# Patient Record
Sex: FEMALE | Race: BLACK OR AFRICAN AMERICAN | NOT HISPANIC OR LATINO | Employment: FULL TIME | URBAN - METROPOLITAN AREA
[De-identification: names, ages, dates, MRNs, and addresses within clinical notes are randomized per-mention and may not be internally consistent; named-entity substitution may affect disease eponyms.]

---

## 2024-09-05 ENCOUNTER — TELEPHONE (OUTPATIENT)
Dept: INTERNAL MEDICINE | Facility: CLINIC | Age: 59
End: 2024-09-05
Payer: COMMERCIAL

## 2024-09-05 NOTE — TELEPHONE ENCOUNTER
Contacted the patient regarding an appointment. Enedina requested to switch appointments with her daughter, Lisa. They're establishing care with Lisa Abel on 9/13 & Enedina on 11/4. I sent her request to my supervisor, Laury, for the appointment adjustment & she was able to accommodate her. I called Enedina to let her know that her appointment is now on 9/13 & her daughter, Lisa's is on 11/4.

## 2024-09-05 NOTE — TELEPHONE ENCOUNTER
----- Message from Margret Conrad sent at 2024 12:46 PM CDT -----  Contact: pt  Enedina is calling to switch appts with her daughter:    Enedina is live for  @ 9:40am    Daughter Lisa Goodman (MRN: [id number]  : 2001)  Is live for  @ 9:40am    (I was not able to do this in Book it)    Please see if you can switch the appts and call to advise at [phone]

## 2024-09-13 ENCOUNTER — LAB VISIT (OUTPATIENT)
Dept: LAB | Facility: HOSPITAL | Age: 59
End: 2024-09-13
Attending: FAMILY MEDICINE
Payer: COMMERCIAL

## 2024-09-13 ENCOUNTER — OFFICE VISIT (OUTPATIENT)
Dept: INTERNAL MEDICINE | Facility: CLINIC | Age: 59
End: 2024-09-13
Payer: COMMERCIAL

## 2024-09-13 VITALS
BODY MASS INDEX: 36.49 KG/M2 | SYSTOLIC BLOOD PRESSURE: 126 MMHG | OXYGEN SATURATION: 98 % | HEIGHT: 63 IN | HEART RATE: 69 BPM | TEMPERATURE: 97 F | DIASTOLIC BLOOD PRESSURE: 76 MMHG | WEIGHT: 205.94 LBS

## 2024-09-13 DIAGNOSIS — Z00.00 ROUTINE GENERAL MEDICAL EXAMINATION AT A HEALTH CARE FACILITY: ICD-10-CM

## 2024-09-13 DIAGNOSIS — I10 PRIMARY HYPERTENSION: Chronic | ICD-10-CM

## 2024-09-13 DIAGNOSIS — Z11.3 SCREEN FOR STD (SEXUALLY TRANSMITTED DISEASE): ICD-10-CM

## 2024-09-13 DIAGNOSIS — E55.9 VITAMIN D DEFICIENCY: ICD-10-CM

## 2024-09-13 DIAGNOSIS — H61.23 BILATERAL IMPACTED CERUMEN: ICD-10-CM

## 2024-09-13 DIAGNOSIS — Z00.00 ROUTINE GENERAL MEDICAL EXAMINATION AT A HEALTH CARE FACILITY: Primary | ICD-10-CM

## 2024-09-13 LAB
ALBUMIN SERPL BCP-MCNC: 3.8 G/DL (ref 3.5–5.2)
ALP SERPL-CCNC: 96 U/L (ref 55–135)
ALT SERPL W/O P-5'-P-CCNC: 13 U/L (ref 10–44)
ANION GAP SERPL CALC-SCNC: 7 MMOL/L (ref 8–16)
AST SERPL-CCNC: 14 U/L (ref 10–40)
BASOPHILS # BLD AUTO: 0.05 K/UL (ref 0–0.2)
BASOPHILS NFR BLD: 0.7 % (ref 0–1.9)
BILIRUB SERPL-MCNC: 0.3 MG/DL (ref 0.1–1)
BUN SERPL-MCNC: 14 MG/DL (ref 6–20)
CALCIUM SERPL-MCNC: 9.8 MG/DL (ref 8.7–10.5)
CHLORIDE SERPL-SCNC: 106 MMOL/L (ref 95–110)
CHOLEST SERPL-MCNC: 203 MG/DL (ref 120–199)
CHOLEST/HDLC SERPL: 2.4 {RATIO} (ref 2–5)
CO2 SERPL-SCNC: 26 MMOL/L (ref 23–29)
CREAT SERPL-MCNC: 0.9 MG/DL (ref 0.5–1.4)
DIFFERENTIAL METHOD BLD: ABNORMAL
EOSINOPHIL # BLD AUTO: 0 K/UL (ref 0–0.5)
EOSINOPHIL NFR BLD: 0.4 % (ref 0–8)
ERYTHROCYTE [DISTWIDTH] IN BLOOD BY AUTOMATED COUNT: 14.3 % (ref 11.5–14.5)
EST. GFR  (NO RACE VARIABLE): >60 ML/MIN/1.73 M^2
ESTIMATED AVG GLUCOSE: 108 MG/DL (ref 68–131)
GLUCOSE SERPL-MCNC: 88 MG/DL (ref 70–110)
HBA1C MFR BLD: 5.4 % (ref 4–5.6)
HCT VFR BLD AUTO: 45.8 % (ref 37–48.5)
HDLC SERPL-MCNC: 84 MG/DL (ref 40–75)
HDLC SERPL: 41.4 % (ref 20–50)
HGB BLD-MCNC: 13.8 G/DL (ref 12–16)
IMM GRANULOCYTES # BLD AUTO: 0.04 K/UL (ref 0–0.04)
IMM GRANULOCYTES NFR BLD AUTO: 0.6 % (ref 0–0.5)
LDLC SERPL CALC-MCNC: 103.2 MG/DL (ref 63–159)
LYMPHOCYTES # BLD AUTO: 1.7 K/UL (ref 1–4.8)
LYMPHOCYTES NFR BLD: 24.3 % (ref 18–48)
MCH RBC QN AUTO: 29.6 PG (ref 27–31)
MCHC RBC AUTO-ENTMCNC: 30.1 G/DL (ref 32–36)
MCV RBC AUTO: 98 FL (ref 82–98)
MONOCYTES # BLD AUTO: 0.4 K/UL (ref 0.3–1)
MONOCYTES NFR BLD: 5.6 % (ref 4–15)
NEUTROPHILS # BLD AUTO: 4.7 K/UL (ref 1.8–7.7)
NEUTROPHILS NFR BLD: 68.4 % (ref 38–73)
NONHDLC SERPL-MCNC: 119 MG/DL
NRBC BLD-RTO: 0 /100 WBC
PLATELET # BLD AUTO: 341 K/UL (ref 150–450)
PMV BLD AUTO: 10.3 FL (ref 9.2–12.9)
POTASSIUM SERPL-SCNC: 4.2 MMOL/L (ref 3.5–5.1)
PROT SERPL-MCNC: 7.4 G/DL (ref 6–8.4)
RBC # BLD AUTO: 4.67 M/UL (ref 4–5.4)
SODIUM SERPL-SCNC: 139 MMOL/L (ref 136–145)
T4 FREE SERPL-MCNC: 0.96 NG/DL (ref 0.71–1.51)
TREPONEMA PALLIDUM IGG+IGM AB [PRESENCE] IN SERUM OR PLASMA BY IMMUNOASSAY: NONREACTIVE
TRIGL SERPL-MCNC: 79 MG/DL (ref 30–150)
TSH SERPL DL<=0.005 MIU/L-ACNC: 1.14 UIU/ML (ref 0.4–4)
WBC # BLD AUTO: 6.92 K/UL (ref 3.9–12.7)

## 2024-09-13 PROCEDURE — 80061 LIPID PANEL: CPT | Performed by: FAMILY MEDICINE

## 2024-09-13 PROCEDURE — 87529 HSV DNA AMP PROBE: CPT | Performed by: FAMILY MEDICINE

## 2024-09-13 PROCEDURE — 99386 PREV VISIT NEW AGE 40-64: CPT | Mod: S$GLB,,, | Performed by: FAMILY MEDICINE

## 2024-09-13 PROCEDURE — 86803 HEPATITIS C AB TEST: CPT | Performed by: FAMILY MEDICINE

## 2024-09-13 PROCEDURE — 3078F DIAST BP <80 MM HG: CPT | Mod: CPTII,S$GLB,, | Performed by: FAMILY MEDICINE

## 2024-09-13 PROCEDURE — 87389 HIV-1 AG W/HIV-1&-2 AB AG IA: CPT | Performed by: FAMILY MEDICINE

## 2024-09-13 PROCEDURE — 86696 HERPES SIMPLEX TYPE 2 TEST: CPT | Performed by: FAMILY MEDICINE

## 2024-09-13 PROCEDURE — 86593 SYPHILIS TEST NON-TREP QUANT: CPT | Performed by: FAMILY MEDICINE

## 2024-09-13 PROCEDURE — 3074F SYST BP LT 130 MM HG: CPT | Mod: CPTII,S$GLB,, | Performed by: FAMILY MEDICINE

## 2024-09-13 PROCEDURE — 83036 HEMOGLOBIN GLYCOSYLATED A1C: CPT | Performed by: FAMILY MEDICINE

## 2024-09-13 PROCEDURE — 1159F MED LIST DOCD IN RCRD: CPT | Mod: CPTII,S$GLB,, | Performed by: FAMILY MEDICINE

## 2024-09-13 PROCEDURE — 84443 ASSAY THYROID STIM HORMONE: CPT | Performed by: FAMILY MEDICINE

## 2024-09-13 PROCEDURE — 84439 ASSAY OF FREE THYROXINE: CPT | Performed by: FAMILY MEDICINE

## 2024-09-13 PROCEDURE — 3008F BODY MASS INDEX DOCD: CPT | Mod: CPTII,S$GLB,, | Performed by: FAMILY MEDICINE

## 2024-09-13 PROCEDURE — 86695 HERPES SIMPLEX TYPE 1 TEST: CPT | Performed by: FAMILY MEDICINE

## 2024-09-13 PROCEDURE — 3044F HG A1C LEVEL LT 7.0%: CPT | Mod: CPTII,S$GLB,, | Performed by: FAMILY MEDICINE

## 2024-09-13 PROCEDURE — 85025 COMPLETE CBC W/AUTO DIFF WBC: CPT | Performed by: FAMILY MEDICINE

## 2024-09-13 PROCEDURE — 82652 VIT D 1 25-DIHYDROXY: CPT | Performed by: FAMILY MEDICINE

## 2024-09-13 PROCEDURE — 99999 PR PBB SHADOW E&M-EST. PATIENT-LVL III: CPT | Mod: PBBFAC,,, | Performed by: FAMILY MEDICINE

## 2024-09-13 PROCEDURE — 80053 COMPREHEN METABOLIC PANEL: CPT | Performed by: FAMILY MEDICINE

## 2024-09-13 PROCEDURE — 4010F ACE/ARB THERAPY RXD/TAKEN: CPT | Mod: CPTII,S$GLB,, | Performed by: FAMILY MEDICINE

## 2024-09-13 PROCEDURE — 1160F RVW MEDS BY RX/DR IN RCRD: CPT | Mod: CPTII,S$GLB,, | Performed by: FAMILY MEDICINE

## 2024-09-13 RX ORDER — LISINOPRIL 20 MG/1
20 TABLET ORAL
COMMUNITY

## 2024-09-13 RX ORDER — LIFITEGRAST 50 MG/ML
1 SOLUTION/ DROPS OPHTHALMIC 2 TIMES DAILY
COMMUNITY
Start: 2024-07-21

## 2024-09-13 NOTE — PROGRESS NOTES
ADVISED PT LOSARTAN WAS ALREADY SENT TO [facility], HE IS NOT SURE IF HE NEEDS ANYTHING ELSE REFILLED, CALL HIM BACK Subjective     Patient ID: Enedina Goodman is a 59 y.o. female.    Chief Complaint: Establish Care and Annual Exam    History of Present Illness    CHIEF COMPLAINT:  Enedina presents today for an annual exam and to discuss recent test results.    SEXUAL HEALTH:  She reports receiving a positive HSV-2 test result in July, denying any symptoms. She is currently not sexually active but anticipates being so in the future. She expresses concerns about potential transmission and seeks guidance on managing potential risks.    GRIEF AND LIFE CHANGES:  She reports the loss of her  due to COVID in 2021, experiencing ongoing grief. She has sought some grief counseling but primarily rita with grief as it arises. She notes experiencing triggers, particularly during holidays and family events. 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 She reports having a strong support system of friends and family.    SLEEP:  She reports difficulty falling asleep, often staying up late despite recognizing the need for rest. This sleep disturbance has been present since her 's passing. She denies feeling overpowered by sleepiness at bedtime. Regular exercise, specifically using her treadmill 3-4 times per week, improves her sleep quality.    MEDICAL HISTORY:  She reports a history of colon polyps, with her most recent colonoscopy last year showing no polyps. She is on a 5-year follow-up schedule for colon cancer screening. She also reports a history of chicken pox during childhood and a dry eye condition.    MEDICATIONS AND SUPPLEMENTS:  She continues to use amlodipine and lisinopril without issues. No refills are needed at this time. She takes vitamin D supplementation and uses eye drops for dry eyes.      SOCIAL  HISTORY:  She is retired from her previous role as an  director with the [employer]. She currently works from home as an  for a nonprofit organization, which she finds satisfying.    PREVENTIVE CARE:  Her colonoscopy is up to date, with the most recent one last year. Her mammogram is due in November. Her tetanus vaccination is not due until 2026. She is considering the shingles vaccine but expresses concern about potential reactions based on past experiences.    ALLERGIES:  She reports a history of reactions to vaccines, expressing hesitancy about receiving the shingles vaccine.    ENT:  She reports wax buildup in both ears but denies any problems with hearing. She has a dry eye condition and uses eye drops to manage it. An eye doctor had recommended a procedure for her dry eyes, but she has not pursued it due to the cost. Her last eye exam was approximately 8 years ago, with no reported issues at that time.      ROS:  General: -fever, -chills, -fatigue, -weight gain, -weight loss  Eyes: -vision changes, -redness, -discharge, +dry eyes  ENT: -ear pain, -nasal congestion, -sore throat, -hearing loss  Cardiovascular: -chest pain, -palpitations, -lower extremity edema  Respiratory: -cough, -shortness of breath  Gastrointestinal: -abdominal pain, -nausea, -vomiting, -diarrhea, -constipation, -blood in stool  Genitourinary: -dysuria, -hematuria, -frequency  Musculoskeletal: -joint pain, -muscle pain  Skin: -rash, -lesion  Neurological: -headache, -dizziness, -numbness, -tingling  Psychiatric: -anxiety, -depression, +sleep difficulty, +emotional lability  Allergic: +allergic reactions            Objective     Physical Exam  Vitals and nursing note reviewed.   Constitutional:       Appearance: Normal appearance. She is obese.   HENT:      Head: Normocephalic and atraumatic.      Right Ear: Tympanic membrane, ear canal and external ear normal. There is impacted cerumen.      Left Ear: Tympanic  membrane, ear canal and external ear normal. There is impacted cerumen.      Nose: Nose normal.      Mouth/Throat:      Mouth: Mucous membranes are moist.      Pharynx: Oropharynx is clear.   Eyes:      Extraocular Movements: Extraocular movements intact.      Conjunctiva/sclera: Conjunctivae normal.   Cardiovascular:      Rate and Rhythm: Normal rate and regular rhythm.      Pulses: Normal pulses.      Heart sounds: Normal heart sounds.   Pulmonary:      Effort: Pulmonary effort is normal.      Breath sounds: Normal breath sounds.   Abdominal:      General: Abdomen is flat. Bowel sounds are normal.      Palpations: Abdomen is soft.   Musculoskeletal:      Cervical back: Normal range of motion and neck supple.      Right lower leg: No edema.      Left lower leg: No edema.   Lymphadenopathy:      Cervical: No cervical adenopathy.   Skin:     General: Skin is warm and dry.   Neurological:      General: No focal deficit present.      Mental Status: She is alert and oriented to person, place, and time.   Psychiatric:         Mood and Affect: Mood normal.         Behavior: Behavior normal.                Assessment and Plan     1. Routine general medical examination at a health care facility  Comments:  reviewed age appropriate screenings and immunizations  Orders:  -     T4, Free; Future; Expected date: 09/13/2024  -     TSH; Future; Expected date: 09/13/2024  -     Hemoglobin A1C; Future; Expected date: 09/13/2024  -     Lipid Panel; Future; Expected date: 09/13/2024  -     Comprehensive Metabolic Panel; Future; Expected date: 09/13/2024  -     CBC Auto Differential; Future; Expected date: 09/13/2024    2. Screen for STD (sexually transmitted disease)  -     HIV 1/2 Ag/Ab (4th Gen); Future; Expected date: 09/13/2024  -     HEPATITIS C ANTIBODY; Future; Expected date: 09/13/2024  -     Treponema Pallidium Antibodies IgG, IgM; Future; Expected date: 09/13/2024  -     HERPES SIMPLEX 1&2 IGG; Future; Expected date:  09/13/2024  -     HERPES SIMPLEX VIRUS (HSV) TYPE 1 & 2 DNA BY PCR; Future; Expected date: 09/13/2024    3. Vitamin D deficiency  -     Calcitriol; Future; Expected date: 09/13/2024    4. Primary hypertension  Comments:  stable cont amlodipine and lisinopril    5. Bilateral impacted cerumen  -     Ear wax removal        Assessment & Plan    Assessed HSV status: no active infection or symptoms present, no immediate treatment necessary  Recommend rechecking HSV IgG and IgM levels to confirm current status and inform future management  Considered grief process following spouse's death in 2021 and its potential impact on sleep patterns  Evaluated support system and current work situation as part of overall well-being assessment  Noted patient is up-to-date on most preventive screenings, including recent colonoscopy with Dr. Harrell    PATIENT EDUCATION:   Explained HSV is common and can be contracted through various means, not just sexual contact   Clarified HSV-1 typically causes oral herpes while HSV-2 typically causes genital herpes, but cross-infection can occur   Discussed HSV can be present without symptoms and does not necessarily indicate infidelity   Informed about shingles vaccine availability, acknowledging history of vaccine reactions    MEDICATIONS:   Continued amlodipine and lisinopril at current doses    ORDERS:   Ordered comprehensive lab work: CBC, chemistry panel, cholesterol, thyroid function, A1C, vitamin D level   Added HSV IgG and IgM tests to confirm current status   Ordered HIV, hepatitis C, and syphilis screening   Performed earwax removal procedure    FOLLOW UP:   Follow up with new OB/GYN in November for mammogram scheduling   Contact the office if any questions or concerns arise before next appointment              Follow up in about 1 year (around 9/13/2025) for [facility] Dr. Harrell, Annual Exam.    This note was generated with the assistance of ambient listening technology.  Verbal consent was obtained by the patient and accompanying visitor(s) for the recording of patient appointment to facilitate this note. I attest to having reviewed and edited the generated note for accuracy, though some syntax or spelling errors may persist. Please contact the author of this note for any clarification.

## 2024-09-14 LAB
HCV AB SERPL QL IA: NORMAL
HIV 1+2 AB+HIV1 P24 AG SERPL QL IA: NORMAL
HSV1 IGG SERPL QL IA: POSITIVE
HSV2 IGG SERPL QL IA: POSITIVE

## 2024-09-16 LAB — 1,25(OH)2D3 SERPL-MCNC: 92 PG/ML (ref 20–79)

## 2024-09-17 PROBLEM — I10 PRIMARY HYPERTENSION: Chronic | Status: ACTIVE | Noted: 2024-09-17

## 2024-09-17 LAB
HSV-1 DNA BY PCR: NEGATIVE
HSV-2 DNA BY PCR: NEGATIVE

## 2024-09-25 ENCOUNTER — PATIENT OUTREACH (OUTPATIENT)
Dept: ADMINISTRATIVE | Facility: HOSPITAL | Age: 59
End: 2024-09-25
Payer: COMMERCIAL

## 2024-12-19 ENCOUNTER — PATIENT MESSAGE (OUTPATIENT)
Dept: ADMINISTRATIVE | Facility: HOSPITAL | Age: 59
End: 2024-12-19
Payer: COMMERCIAL

## 2024-12-27 ENCOUNTER — OFFICE VISIT (OUTPATIENT)
Dept: INTERNAL MEDICINE | Facility: CLINIC | Age: 59
End: 2024-12-27
Payer: COMMERCIAL

## 2024-12-27 DIAGNOSIS — Z20.828 EXPOSURE TO HERPES SIMPLEX VIRUS (HSV): ICD-10-CM

## 2024-12-27 DIAGNOSIS — Z70.8 SEXUALLY TRANSMITTED DISEASE COUNSELING: Primary | ICD-10-CM

## 2024-12-27 PROCEDURE — 1160F RVW MEDS BY RX/DR IN RCRD: CPT | Mod: CPTII,95,, | Performed by: FAMILY MEDICINE

## 2024-12-27 PROCEDURE — 99213 OFFICE O/P EST LOW 20 MIN: CPT | Mod: 95,,, | Performed by: FAMILY MEDICINE

## 2024-12-27 PROCEDURE — 1159F MED LIST DOCD IN RCRD: CPT | Mod: CPTII,95,, | Performed by: FAMILY MEDICINE

## 2024-12-27 PROCEDURE — 3044F HG A1C LEVEL LT 7.0%: CPT | Mod: CPTII,95,, | Performed by: FAMILY MEDICINE

## 2024-12-27 PROCEDURE — 4010F ACE/ARB THERAPY RXD/TAKEN: CPT | Mod: CPTII,95,, | Performed by: FAMILY MEDICINE

## 2025-01-01 NOTE — PROGRESS NOTES
Subjective     Patient ID: Enedina Goodman is a 59 y.o. female.          The patient location is: home  The chief complaint leading to consultation is: review test results    Visit type: audiovisual    Face to Face time with patient: 10 min  20 min minutes of total time spent on the encounter, which includes face to face time and non-face to face time preparing to see the patient (eg, review of tests), Obtaining and/or reviewing separately obtained history, Documenting clinical information in the electronic or other health record, Independently interpreting results (not separately reported) and communicating results to the patient/family/caregiver, or Care coordination (not separately reported).         Each patient to whom he or she provides medical services by telemedicine is:  (1) informed of the relationship between the physician and patient and the respective role of any other health care provider with respect to management of the patient; and (2) notified that he or she may decline to receive medical services by telemedicine and may withdraw from such care at any time.    Notes:     History of Present Illness    CHIEF COMPLAINT:  Enedina presents to discuss herpes test results and seek guidance on antivirals and potential transmission to a partner.    HPI:  Enedina contacted Dr. Rachel to discuss her herpes status and seek information about antiviral medication. She has disclosed her herpes diagnosis to her current partner. Enedina has undergone two types of tests for herpes simplex virus (HSV): an HSV-ID test, which showed the presence of HSV antibodies, indicating past exposure to the virus, and a DNR test, which showed no active infection or active virus present in her body. Enedina is concerned about alcohol consumption while taking antivirals. She denies any current symptoms of herpes or active herpes infection.    MEDICATIONS:  Enedina is not currently taking any antivirals, although they have  been considered.    MEDICAL HISTORY:  Enedina has a history of exposure to the HSV virus and has developed antibodies, but does not have an active infection. Enedina is sexually active.    TEST RESULTS:  The HSV-ID test was positive for HSV antibodies, indicating past exposure to the HSV virus. The DNA test was negative for active HSV infection, with no active virus detected.    SOCIAL HISTORY:  Enedina reports alcohol consumption. She is single and currently dating.      ROS:  General: -fever, -chills, -fatigue, -weight gain, -weight loss  Eyes: -vision changes, -redness, -discharge  ENT: -ear pain, -nasal congestion, -sore throat  Cardiovascular: -chest pain, -palpitations, -lower extremity edema  Respiratory: -cough, -shortness of breath  Gastrointestinal: -abdominal pain, -nausea, -vomiting, -diarrhea, -constipation, -blood in stool  Genitourinary: -dysuria, -hematuria, -frequency  Musculoskeletal: -joint pain, -muscle pain  Skin: -rash, -lesion  Neurological: -headache, -dizziness, -numbness, -tingling  Psychiatric: -anxiety, -depression, -sleep difficulty            Objective     Physical Exam  Vitals and nursing note reviewed.   Constitutional:       Appearance: Normal appearance.   HENT:      Head: Normocephalic and atraumatic.   Pulmonary:      Effort: Pulmonary effort is normal.   Neurological:      General: No focal deficit present.      Mental Status: She is alert and oriented to person, place, and time.   Psychiatric:         Mood and Affect: Mood normal.         Behavior: Behavior normal.              Assessment and Plan     1. Sexually transmitted disease counseling    2. Exposure to herpes simplex virus (HSV)        Assessment & Plan    Reviewed HSV-ID test results showing presence of HSV antibodies, indicating past exposure  Analyzed DNR test results showing no active HSV infection  Determined patient cannot transmit HSV due to lack of active infection  Concluded no need for antiviral  medication given absence of active infection    PATIENT EDUCATION:   Explained HSV transmission can occur through various means, including non-sexual contact (e.g., sharing glasses, kissing)   Clarified that HSV is common and can be contracted through minor, non-sexual interactions   Informed patient that current test results show no active virus, eliminating risk of transmission to others    FOLLOW UP:   Contact the office if any HSV symptoms develop              Follow up if symptoms worsen or fail to improve.    This note was generated with the assistance of ambient listening technology. Verbal consent was obtained by the patient and accompanying visitor(s) for the recording of patient appointment to facilitate this note. I attest to having reviewed and edited the generated note for accuracy, though some syntax or spelling errors may persist. Please contact the author of this note for any clarification.